# Patient Record
(demographics unavailable — no encounter records)

---

## 2024-10-26 NOTE — ASSESSMENT
[FreeTextEntry1] :  52 year old female patient with gross hematuria. I have asked the patient to obtain a CTurogram with and without contrast. We will send her urine today for cytology. Further decisions will be made after we have those results. Depending on the findings of her CT, we will then set her up for a cystoscopy.  All of the patient's questions were otherwise answered. Pt understands and agrees to follow-up as scheduled. Pt seen with ANALIA Jay.Total time=45 min   The submitted E/M billing level for this visit reflects the total time spent on the day of the visit including face-to-face time spent with the patient, non-face-to-face review of medical records and relevant information, documentation, and asynchronous communication with the patient after a visit via phone, email, or patients EHR portal after the visit. The medical records reviewed are either scanned into the chart or reviewed with the patient using a patients electronic medical records portal for patients with records not available to [facility] via electronic transmission platforms from other institutions and labs. Time spent counseling and performing coordination of care was also included in determining the appropriate EM billing level.   I have reviewed and verified information regarding the chief complaint and history recorded by the ancillary staff and/or the patient. I have independently reviewed and interpreted tests performed by other physicians and facilities as necessary.   I have discussed with the patient differential diagnosis, reason for auxiliary tests if ordered, risks, benefits, alternatives, and complications of each form of therapy were discussed.  I personally performed the services described in the documentation, reviewed the documentation recorded by the scribe in my presence, and it accurately and completely records my words and actions.

## 2024-10-26 NOTE — ASSESSMENT
[FreeTextEntry1] :  52 year old female patient with gross hematuria. I have asked the patient to obtain a CTurogram with and without contrast. We will send her urine today for cytology. Further decisions will be made after we have those results. Depending on the findings of her CT, we will then set her up for a cystoscopy.  All of the patient's questions were otherwise answered. Pt understands and agrees to follow-up as scheduled. Pt seen with ANALIA Jay.Total time=45 min   The submitted E/M billing level for this visit reflects the total time spent on the day of the visit including face-to-face time spent with the patient, non-face-to-face review of medical records and relevant information, documentation, and asynchronous communication with the patient after a visit via phone, email, or patients EHR portal after the visit. The medical records reviewed are either scanned into the chart or reviewed with the patient using a patients electronic medical records portal for patients with records not available to [facility] via electronic transmission platforms from other institutions and labs. Time spent counseling and performing coordination of care was also included in determining the appropriate EM billing level.   I have reviewed and verified information regarding the chief complaint and history recorded by the ancillary staff and/or the patient. I have independently reviewed and interpreted tests performed by other physicians and facilities as necessary.   I have discussed with the patient differential diagnosis, reason for auxiliary tests if ordered, risks, benefits, alternatives, and complications of each form of therapy were discussed.  I personally performed the services described in the documentation, reviewed the documentation recorded by the scribe in my presence, and it accurately and completely records my words and actions.

## 2024-10-26 NOTE — HISTORY OF PRESENT ILLNESS
[FreeTextEntry1] :  52 year old female patient who comes in for further evaluation of two episodes of gross hematuria: one in May and one in September. GYN ordered urine cultures, thinking she may have an infection, however both were negative. She reports nocturia x2 and not much frequency during the day.No history of stones or anticoagulant use  PMHx: Unremarkable PSHx: uterine fibroid removal,   SHx: Denies tobacco use. Fhx: stone history (brother)

## 2024-10-26 NOTE — ADDENDUM
[FreeTextEntry1] :  LEIDA STUBBS, am scribing for and in the presence of  in the following sections: HISTORY OF PRESENT ILLNESS, PAST MEDICAL/FAMILY/SOCIAL HISTORY, REVIEW OF SYSTEMS, VITAL SIGNS, PHYSICAL EXAM, ASSESSMENT/PLAN on 10/22/2024.